# Patient Record
Sex: MALE | Race: WHITE | NOT HISPANIC OR LATINO | Employment: OTHER | URBAN - METROPOLITAN AREA
[De-identification: names, ages, dates, MRNs, and addresses within clinical notes are randomized per-mention and may not be internally consistent; named-entity substitution may affect disease eponyms.]

---

## 2017-02-21 ENCOUNTER — CONSULT (OUTPATIENT)
Dept: SLEEP MEDICINE | Facility: HOSPITAL | Age: 52
End: 2017-02-21

## 2017-02-21 VITALS
BODY MASS INDEX: 30.83 KG/M2 | DIASTOLIC BLOOD PRESSURE: 86 MMHG | OXYGEN SATURATION: 94 % | SYSTOLIC BLOOD PRESSURE: 128 MMHG | HEIGHT: 68 IN | HEART RATE: 92 BPM | WEIGHT: 203.4 LBS

## 2017-02-21 DIAGNOSIS — G47.10 HYPERSOMNIA WITH SLEEP APNEA: ICD-10-CM

## 2017-02-21 DIAGNOSIS — G47.33 OBSTRUCTIVE SLEEP APNEA, ADULT: Primary | ICD-10-CM

## 2017-02-21 DIAGNOSIS — G47.61 PERIODIC LIMB MOVEMENT DISORDER: ICD-10-CM

## 2017-02-21 DIAGNOSIS — G47.30 HYPERSOMNIA WITH SLEEP APNEA: ICD-10-CM

## 2017-02-21 PROBLEM — I10 HYPERTENSION: Status: ACTIVE | Noted: 2017-02-21

## 2017-02-21 PROBLEM — E66.09 NON MORBID OBESITY DUE TO EXCESS CALORIES: Status: ACTIVE | Noted: 2017-02-21

## 2017-02-21 PROBLEM — F32.A DEPRESSION: Status: ACTIVE | Noted: 2017-02-21

## 2017-02-21 PROCEDURE — 99244 OFF/OP CNSLTJ NEW/EST MOD 40: CPT | Performed by: INTERNAL MEDICINE

## 2017-02-21 NOTE — PATIENT INSTRUCTIONS
Hypersomnia  Hypersomnia is when you feel extremely tired during the day even though you're getting plenty of sleep at night. You may need to take naps during the day, and you may also be extremely difficult to wake up when you are sleeping.   CAUSES   The cause of your hypersomnia may not be known. Hypersomnia may be caused by:   · Medicines.  · Sleep disorders, such as narcolepsy.  · Trauma or injury to your head or nervous system.  · Using drugs or alcohol.  · Tumors.  · Medical conditions, such as depression or hypothyroidism.  · Genetics.  SIGNS AND SYMPTOMS   The main symptoms of hypersomnia include:   · Feeling extremely tired throughout the day.  · Being very difficult to wake up.  · Sleeping for longer and longer periods.  · Taking naps throughout the day.  Other symptoms may include:   · Feeling:  ¨ Restless.  ¨ Annoyed.  ¨ Anxious.  ¨ Low energy.  · Having difficulty:  ¨ Remembering.  ¨ Speaking.  ¨ Thinking.  · Losing your appetite.  · Experiencing hallucinations.  DIAGNOSIS   Hypersomnia may be diagnosed by:  · Medical history and physical exam. This will include a sleep history.  · Completing sleep logs.  · Tests may also be done, such as:    Polysomnography.    Multiple sleep latency test (MSLT).  TREATMENT   There is no cure for hypersomnia, but treatment can be very effective in helping manage the condition. Treatment may include:  · Lifestyle and sleeping strategies to help cope with the condition.  · Stimulant medicines.  · Treating any underlying causes of hypersomnia.  HOME CARE INSTRUCTIONS  · Take medicines only as directed by your health care provider.  · Schedule short naps for when you feel sleepiest during the day. Tell your employer or teachers that you have hypersomnia. You may be able to adjust your schedule to include time for naps.  · Avoid drinking alcohol or caffeinated beverages.  · Do not eat a heavy meal before bedtime. Eat at about the same times every day.  · Do not drive or  operate heavy machinery if you are sleepy.  · Do not swim or go out on the water without a life jacket.  · If possible, adjust your schedule so that you do not have to work or be active at night.  · Keep all follow-up visits as directed by your health care provider. This is important.  SEEK MEDICAL CARE IF:   · You have new symptoms.   · Your symptoms get worse.  SEEK IMMEDIATE MEDICAL CARE IF:   You have serious thoughts of hurting yourself or someone else.      This information is not intended to replace advice given to you by your health care provider. Make sure you discuss any questions you have with your health care provider.     Document Released: 12/08/2003 Document Revised: 01/08/2016 Document Reviewed: 07/23/2015  [employer] Interactive Patient Education ©2016 [employer]  Sleep Apnea   Sleep apnea is a sleep disorder characterized by abnormal pauses in breathing while you sleep. When your breathing pauses, the level of oxygen in your blood decreases. This causes you to move out of deep sleep and into light sleep. As a result, your quality of sleep is poor, and the system that carries your blood throughout your body (cardiovascular system) experiences stress. If sleep apnea remains untreated, the following conditions can develop:  · High blood pressure (hypertension).  · Coronary artery disease.  · Inability to achieve or maintain an erection (impotence).  · Impairment of your thought process (cognitive dysfunction).  There are three types of sleep apnea:  1. Obstructive sleep apnea--Pauses in breathing during sleep because of a blocked airway.  2. Central sleep apnea--Pauses in breathing during sleep because the area of the brain that controls your breathing does not send the correct signals to the muscles that control breathing.  3. Mixed sleep apnea--A combination of both obstructive and central sleep apnea.  RISK FACTORS  The following risk factors can increase your risk of developing sleep  "apnea:  · Being overweight.  · Smoking.  · Having narrow passages in your nose and throat.  · Being of older age.  · Being male.  · Alcohol use.  · Sedative and tranquilizer use.  · Ethnicity. Among individuals younger than 35 years,  Americans are at increased risk of sleep apnea.  SYMPTOMS   · Difficulty staying asleep.  · Daytime sleepiness and fatigue.  · Loss of energy.  · Irritability.  · Loud, heavy snoring.  · Morning headaches.  · Trouble concentrating.  · Forgetfulness.  · Decreased interest in sex.  · Unexplained sleepiness.  DIAGNOSIS   In order to diagnose sleep apnea, your caregiver will perform a physical examination. A sleep study done in the comfort of your own home may be appropriate if you are otherwise healthy. Your caregiver may also recommend that you spend the night in a sleep lab. In the sleep lab, several monitors record information about your heart, lungs, and brain while you sleep. Your leg and arm movements and blood oxygen level are also recorded.  TREATMENT  The following actions may help to resolve mild sleep apnea:  · Sleeping on your side.    · Using a decongestant if you have nasal congestion.    · Avoiding the use of depressants, including alcohol, sedatives, and narcotics.    · Losing weight and modifying your diet if you are overweight.  There also are devices and treatments to help open your airway:  · Oral appliances. These are custom-made mouthpieces that shift your lower jaw forward and slightly open your bite. This opens your airway.  · Devices that create positive airway pressure. This positive pressure \"splints\" your airway open to help you breathe better during sleep. The following devices create positive airway pressure:    Continuous positive airway pressure (CPAP) device. The CPAP device creates a continuous level of air pressure with an air pump. The air is delivered to your airway through a mask while you sleep. This continuous pressure keeps your airway " open.    Nasal expiratory positive airway pressure (EPAP) device. The EPAP device creates positive air pressure as you exhale. The device consists of single-use valves, which are inserted into each nostril and held in place by adhesive. The valves create very little resistance when you inhale but create much more resistance when you exhale. That increased resistance creates the positive airway pressure. This positive pressure while you exhale keeps your airway open, making it easier to breath when you inhale again.    Bilevel positive airway pressure (BPAP) device. The BPAP device is used mainly in patients with central sleep apnea. This device is similar to the CPAP device because it also uses an air pump to deliver continuous air pressure through a mask. However, with the BPAP machine, the pressure is set at two different levels. The pressure when you exhale is lower than the pressure when you inhale.  · Surgery. Typically, surgery is only done if you cannot comply with less invasive treatments or if the less invasive treatments do not improve your condition. Surgery involves removing excess tissue in your airway to create a wider passage way.     This information is not intended to replace advice given to you by your health care provider. Make sure you discuss any questions you have with your health care provider.     Document Released: 12/08/2003 Document Revised: 01/08/2016 Document Reviewed: 09/26/2016  [employer] Interactive Patient Education ©2016 [employer]

## 2017-02-22 NOTE — PROGRESS NOTES
Subjective   Mulugeta Seth is a 51 y.o. male is being seen for consultation today at the request of Inna Sun MD for the evaluation of obstructive sleep apnea.  His primary care physician is Dr. Pineda    History of Present Illness   Patient's had a history of snoring for roughly 30 years.  He's been noted for the past 2 years to have apneas.  He apparently had a home sleep test in October that diagnosed him with severe obstructive sleep apnea.  He had an AHI of 36.  He was seen by ENT physician who diagnosed with a deviated septum.  He's been placed on a CPAP machine but was still snoring and even when using his CPAP machine.  He says he's also sometimes using it 2-3 hours at night and then waking with the mask knocked off.  He says he doesn't feel any better.  He thinks he is on an auto CPAP of 7-12.    He will still fall asleep if sitting quietly during the day he denies any driving problems.  He sleepy if he spends more time in bed.  Without the machine he has history loud snoring snores in all positions is awakened gasping for breath.  She choking he has trouble breathing through his nose both day and night.  He is not rested in the morning.  He has morning headaches 3-4 days per week.  She has occasional pedal edema.  Has occasional reflux but is not on medications.  He denies hypnagogic hallucinations sleep paralysis.  He has occasional kicking at night.  He also has pain in his right shoulder in his knees and hips at night bothersome.  He is gained 15 pounds in the past year.     He goes to bed at 11 PM to midnight.  Takes him less than 10 minutes to go to sleep.  He awakens 3-4 times during the night.  He thinks he gets about 6 hours of sleep.  He is generally not rested.  He's had a history of hypertension known for 4 years.  He has been on have occasional elevated heart rate.  He has a history of depression.  He denies any history of diabetes or coronary artery disease.    Past medical history  "surgeries had 2 shoulder surgeries on the right and one on the left.  Had a hernia repair he's had a surgery for hydrocele and he also had surgery for an undescended testicle    Allergies without    Habits: Smoking: Without    Alcohol: He has 6 beers per week    Caffeine: He has 2 cups coffee per day    Family history is positive for heart disease in obstructive sleep apnea.  The following portions of the patient's history were reviewed and updated as appropriate: allergies, current medications, past family history, past medical history, past social history, past surgical history and problem list.    Review of Systems   Constitutional: Positive for fatigue and unexpected weight change.   HENT: Positive for sinus pressure and tinnitus.    Eyes: Negative.    Respiratory: Negative.    Gastrointestinal: Positive for diarrhea.   Endocrine: Negative.    Genitourinary: Negative.    Musculoskeletal: Positive for arthralgias, back pain and joint swelling.   Skin: Negative.    Allergic/Immunologic: Negative.    Neurological: Positive for tremors, numbness and headaches.   Hematological: Negative.    Psychiatric/Behavioral: Positive for dysphoric mood. The patient is nervous/anxious.     Clarkia scores 21/24    Objective    Visit Vitals   • /86   • Pulse 92   • Ht 68\" (172.7 cm)   • Wt 203 lb 6.4 oz (92.3 kg)   • SpO2 94%   • BMI 30.93 kg/m2       Physical Exam   Constitutional: He is oriented to person, place, and time. He appears well-developed and well-nourished.   He is obese.   HENT:   Head: Normocephalic and atraumatic.   He has nasal airway narrowing with nasal septal deviation the left and Mallampati class IV anatomy   Eyes: EOM are normal. Pupils are equal, round, and reactive to light.   Neck: Normal range of motion. Neck supple.   Cardiovascular: Normal rate, regular rhythm and normal heart sounds.    Pulmonary/Chest: Effort normal and breath sounds normal.   Abdominal: Soft. Bowel sounds are normal. "   Musculoskeletal: Normal range of motion. He exhibits edema.   He has trace pedal edema   Neurological: He is alert and oriented to person, place, and time.   Skin: Skin is warm and dry.   Psychiatric: He has a normal mood and affect. His behavior is normal.     He did not bring in card for download.    Assessment/Plan   Mulugeta was seen today for sleeping problem.    Diagnoses and all orders for this visit:    Obstructive sleep apnea, adult  -     Polysomnography 4 or More Parameters With CPAP; Future    Hypersomnia with sleep apnea  -     Polysomnography 4 or More Parameters With CPAP; Future    Periodic limb movement disorder  -     Polysomnography 4 or More Parameters With CPAP; Future     patient presents with history as noted above.  He has been on the CPAP machine but does not feel rested.  He reportedly is been snoring on the CPAP machine.  We will plan to do a titration study to make certain he is on adequate pressure to control his respiratory events.  He also has a history of kicking his legs may have periodic limb movements contributing to his disrupted sleep.  If it does appear just a pressure issue we will make correction and see him back then in 2 months and reassess situation.    He is encouraged to lose weight.  He is encouraged to avoid alcohol and sedatives close to bedtime.  He is encouraged practice lateral position sleep    Imer Dale MD [facility]  Sleep Medicine  Pulmonary and Critical Care Medicine

## 2017-03-02 ENCOUNTER — HOSPITAL ENCOUNTER (OUTPATIENT)
Dept: SLEEP MEDICINE | Facility: HOSPITAL | Age: 52
Discharge: HOME | End: 2017-03-02
Attending: INTERNAL MEDICINE | Admitting: INTERNAL MEDICINE

## 2017-03-02 VITALS
OXYGEN SATURATION: 97 % | SYSTOLIC BLOOD PRESSURE: 155 MMHG | HEIGHT: 68 IN | BODY MASS INDEX: 31.16 KG/M2 | DIASTOLIC BLOOD PRESSURE: 92 MMHG | WEIGHT: 205.6 LBS | HEART RATE: 96 BPM

## 2017-03-02 DIAGNOSIS — G47.61 PERIODIC LIMB MOVEMENT DISORDER: ICD-10-CM

## 2017-03-02 DIAGNOSIS — G47.30 HYPERSOMNIA WITH SLEEP APNEA: ICD-10-CM

## 2017-03-02 DIAGNOSIS — G47.10 HYPERSOMNIA WITH SLEEP APNEA: ICD-10-CM

## 2017-03-02 DIAGNOSIS — G47.33 OBSTRUCTIVE SLEEP APNEA, ADULT: ICD-10-CM

## 2017-03-02 PROCEDURE — 95811 POLYSOM 6/>YRS CPAP 4/> PARM: CPT | Performed by: INTERNAL MEDICINE

## 2017-03-02 RX ORDER — AMOXICILLIN 875 MG/1
875 TABLET, COATED ORAL 2 TIMES DAILY
COMMUNITY

## 2017-03-02 RX ORDER — DULOXETIN HYDROCHLORIDE 60 MG/1
60 CAPSULE, DELAYED RELEASE ORAL DAILY
COMMUNITY

## 2017-03-03 DIAGNOSIS — G47.10 HYPERSOMNIA: ICD-10-CM

## 2017-03-03 DIAGNOSIS — R06.83 SNORING: ICD-10-CM

## 2017-03-03 DIAGNOSIS — G47.33 OBSTRUCTIVE SLEEP APNEA, ADULT: Primary | ICD-10-CM

## 2017-03-07 ENCOUNTER — TELEPHONE (OUTPATIENT)
Dept: SLEEP MEDICINE | Facility: HOSPITAL | Age: 52
End: 2017-03-07

## 2017-03-07 NOTE — TELEPHONE ENCOUNTER
Juan C called to clarify the new Max CPAP setting.  Let her know it needs to be set to machine max of 20 cmH2O.  She already had the CPAP min of 12 cmH2O.  She is setting machine up per new settings that were based off his latest titration study of 12-20 cmH2O of auto cpap.  He just got a new [employer]Med machine in [facility] and is able to be seen in airview as I just checked it while on the phone with them.  I have printed a download of the last 30 days although his pressure settings are being changed today to reflect the new settings.

## 2017-04-27 ENCOUNTER — OFFICE VISIT (OUTPATIENT)
Dept: SLEEP MEDICINE | Facility: HOSPITAL | Age: 52
End: 2017-04-27

## 2017-04-27 VITALS
WEIGHT: 208 LBS | OXYGEN SATURATION: 90 % | SYSTOLIC BLOOD PRESSURE: 134 MMHG | BODY MASS INDEX: 31.52 KG/M2 | DIASTOLIC BLOOD PRESSURE: 86 MMHG | HEART RATE: 91 BPM | HEIGHT: 68 IN

## 2017-04-27 DIAGNOSIS — G47.33 OBSTRUCTIVE SLEEP APNEA, ADULT: Primary | ICD-10-CM

## 2017-04-27 PROCEDURE — 99214 OFFICE O/P EST MOD 30 MIN: CPT | Performed by: INTERNAL MEDICINE

## 2017-04-27 NOTE — PATIENT INSTRUCTIONS
Sleep Apnea  Sleep apnea is a condition in which breathing pauses or becomes shallow during sleep. Episodes of sleep apnea usually last 10 seconds or longer, and they may occur as many as 20 times an hour. Sleep apnea disrupts your sleep and keeps your body from getting the rest that it needs. This condition can increase your risk of certain health problems, including:  · Heart attack.  · Stroke.  · Obesity.  · Diabetes.  · Heart failure.  · Irregular heartbeat.  There are three kinds of sleep apnea:  · Obstructive sleep apnea. This kind is caused by a blocked or collapsed airway.  · Central sleep apnea. This kind happens when the part of the brain that controls breathing does not send the correct signals to the muscles that control breathing.  · Mixed sleep apnea. This is a combination of obstructive and central sleep apnea.  CAUSES  The most common cause of this condition is a collapsed or blocked airway. An airway can collapse or become blocked if:  · Your throat muscles are abnormally relaxed.  · Your tongue and tonsils are larger than normal.  · You are overweight.  · Your airway is smaller than normal.  RISK FACTORS  This condition is more likely to develop in people who:  · Are overweight.  · Smoke.  · Have a smaller than normal airway.  · Are elderly.  · Are male.  · Drink alcohol.  · Take sedatives or tranquilizers.  · Have a family history of sleep apnea.  SYMPTOMS  Symptoms of this condition include:  · Trouble staying asleep.  · Daytime sleepiness and tiredness.  · Irritability.  · Loud snoring.  · Morning headaches.  · Trouble concentrating.  · Forgetfulness.  · Decreased interest in sex.  · Unexplained sleepiness.  · Mood swings.  · Personality changes.  · Feelings of depression.  · Waking up often during the night to urinate.  · Dry mouth.  · Sore throat.  DIAGNOSIS  This condition may be diagnosed with:  · A medical history.  · A physical exam.  · A series of tests that are done while you are  sleeping (sleep study). These tests are usually done in a sleep lab, but they may also be done at home.  TREATMENT  Treatment for this condition aims to restore normal breathing and to ease symptoms during sleep. It may involve managing health issues that can affect breathing, such as high blood pressure or obesity. Treatment may include:  · Sleeping on your side.  · Using a decongestant if you have nasal congestion.  · Avoiding the use of depressants, including alcohol, sedatives, and narcotics.  · Losing weight if you are overweight.  · Making changes to your diet.  · Quitting smoking.  · Using a device to open your airway while you sleep, such as:    An oral appliance. This is a custom-made mouthpiece that shifts your lower jaw forward.    A continuous positive airway pressure (CPAP) device. This device delivers oxygen to your airway through a mask.    A nasal expiratory positive airway pressure (EPAP) device. This device has valves that you put into each nostril.    A bi-level positive airway pressure (BPAP) device. This device delivers oxygen to your airway through a mask.  · Surgery if other treatments do not work. During surgery, excess tissue is removed to create a wider airway.  It is important to get treatment for sleep apnea. Without treatment, this condition can lead to:  · High blood pressure.  · Coronary artery disease.  · (Men) An inability to achieve or maintain an erection (impotence).  · Reduced thinking abilities.  HOME CARE INSTRUCTIONS  · Make any lifestyle changes that your health care provider recommends.  · Eat a healthy, well-balanced diet.  · Take over-the-counter and prescription medicines only as told by your health care provider.  · Avoid using depressants, including alcohol, sedatives, and narcotics.  · Take steps to lose weight if you are overweight.  · If you were given a device to open your airway while you sleep, use it only as told by your health care provider.  · Do not use any  tobacco products, such as cigarettes, chewing tobacco, and e-cigarettes. If you need help quitting, ask your health care provider.  · Keep all follow-up visits as told by your health care provider. This is important.  SEEK MEDICAL CARE IF:  · The device that you received to open your airway during sleep is uncomfortable or does not seem to be working.  · Your symptoms do not improve.  · Your symptoms get worse.  SEEK IMMEDIATE MEDICAL CARE IF:  · You develop chest pain.  · You develop shortness of breath.  · You develop discomfort in your back, arms, or stomach.  · You have trouble speaking.  · You have weakness on one side of your body.  · You have drooping in your face.  These symptoms may represent a serious problem that is an emergency. Do not wait to see if the symptoms will go away. Get medical help right away. Call your local emergency services (911 in the [place] Do not drive yourself to the hospital.     This information is not intended to replace advice given to you by your health care provider. Make sure you discuss any questions you have with your health care provider.     Document Released: 12/08/2003 Document Revised: 01/13/2017 Document Reviewed: 09/26/2016  [employer] Interactive Patient Education ©2016 [employer]

## 2017-04-27 NOTE — PROGRESS NOTES
"Subjective   Mulugeta Seth is a 51 y.o. male is here today for follow-up.  He is followed here with obstructive sleep apnea and hypersomnolence.  His primary care physician is Dr. Pineda.  He is also seen by Dr. Sun.    History of Present Illness  Patient seen here on February 21.  He had a history of obstructive sleep apnea hypersomnia and obesity.  He was sent for a titration study.  He had that on March 2.  He thought the titration study went well.  He thought he had a fairly usual night of sleeping.  We have adjusted the pressures on his machine he says he is doing fair with the pressures but overall does not feel more rested.  He denies any other changes in health status.  He has been noted to have some variation in his heart rate and is to have a stress test.  He says he occasionally has a leak from his mask while sleeping.  He is scheduled to have a sinus procedure in a few weeks.  The following portions of the patient's history were reviewed and updated as appropriate: allergies, current medications and problem list.    Review of Systems   Constitutional: Negative.    HENT: Positive for congestion, postnasal drip and sinus pressure.    Eyes: Negative.    Respiratory: Negative.    Cardiovascular: Negative.    Gastrointestinal: Negative.    Endocrine: Negative.    Genitourinary: Negative.    Musculoskeletal: Positive for arthralgias and joint swelling.   Skin: Negative.    Allergic/Immunologic: Negative.    Neurological: Positive for headaches.   Hematological: Negative.    Psychiatric/Behavioral: Positive for dysphoric mood. The patient is nervous/anxious.    Repton scores 21/24    Objective  /86  Pulse 91  Ht 68\" (172.7 cm)  Wt 208 lb (94.3 kg)  SpO2 90%  BMI 31.63 kg/m2    Physical Exam   Constitutional: He is oriented to person, place, and time. He appears well-developed and well-nourished.   He is obese.   HENT:   Head: Normocephalic and atraumatic.   He has nasal airway narrowing and nasal " septal deviation the left and Mallampati class IV anatomy   Eyes: EOM are normal. Pupils are equal, round, and reactive to light.   Neck: Normal range of motion. Neck supple.   Cardiovascular: Normal rate, regular rhythm and normal heart sounds.    Pulmonary/Chest: Effort normal and breath sounds normal.   Abdominal: Soft. Bowel sounds are normal.   Musculoskeletal: Normal range of motion. He exhibits edema.   He has trace pedal edema   Neurological: He is alert and oriented to person, place, and time.   Skin: Skin is warm and dry.   Psychiatric: He has a normal mood and affect. His behavior is normal.    titration study showed he did well on the CPAP of 10.  He did not have evidence of periodic limb movements during the study.    Download from his machine for the past 30 days shows usage 93% the time.  He's using it 5 hours 35 minutes per day.  His current pressure setting is from 12-20.  His AHI is normal at 1.4.      Assessment/Plan   Mulugeta was seen today for follow-up.    Diagnoses and all orders for this visit:    Obstructive sleep apnea, adult  -     CPAP Therapy    Patient is doing well as far as controlling his respiratory events on his current pressures but is still not rested.  We've discussed possible stimulant therapy to help with his morning alertness.  He is not particularly interested in this.  He is to have his minimum CPAP decreased to 10.  We will renew his supplies.  He is encouraged to increase his sleep time.  He is encouraged to avoid alcohol and sedatives close to bedtime.  He is encouraged practice lateral position sleep.  He is encouraged to lose weight.  We will see him back in 6 months.  He is contact us earlier symptoms worsen.    Imer Dale MD [facility]  Sleep Medicine  Pulmonary and Critical Care Medicine